# Patient Record
Sex: MALE | Race: WHITE | NOT HISPANIC OR LATINO
[De-identification: names, ages, dates, MRNs, and addresses within clinical notes are randomized per-mention and may not be internally consistent; named-entity substitution may affect disease eponyms.]

---

## 2017-11-29 PROBLEM — Z00.00 ENCOUNTER FOR PREVENTIVE HEALTH EXAMINATION: Status: ACTIVE | Noted: 2017-11-29

## 2018-01-05 ENCOUNTER — APPOINTMENT (OUTPATIENT)
Dept: CARDIOLOGY | Facility: CLINIC | Age: 83
End: 2018-01-05

## 2018-01-12 ENCOUNTER — APPOINTMENT (OUTPATIENT)
Dept: CARDIOLOGY | Facility: CLINIC | Age: 83
End: 2018-01-12

## 2018-01-15 ENCOUNTER — APPOINTMENT (OUTPATIENT)
Dept: CARDIOLOGY | Facility: CLINIC | Age: 83
End: 2018-01-15
Payer: MEDICARE

## 2018-01-15 PROCEDURE — 93279 PRGRMG DEV EVAL PM/LDLS PM: CPT

## 2018-01-19 ENCOUNTER — RECORD ABSTRACTING (OUTPATIENT)
Age: 83
End: 2018-01-19

## 2018-01-19 DIAGNOSIS — Z87.01 PERSONAL HISTORY OF PNEUMONIA (RECURRENT): ICD-10-CM

## 2018-01-19 DIAGNOSIS — Z87.19 PERSONAL HISTORY OF OTHER DISEASES OF THE DIGESTIVE SYSTEM: ICD-10-CM

## 2018-01-19 DIAGNOSIS — Z78.9 OTHER SPECIFIED HEALTH STATUS: ICD-10-CM

## 2018-02-14 ENCOUNTER — APPOINTMENT (OUTPATIENT)
Dept: CARDIOLOGY | Facility: CLINIC | Age: 83
End: 2018-02-14

## 2018-03-08 ENCOUNTER — APPOINTMENT (OUTPATIENT)
Dept: CARDIOLOGY | Facility: CLINIC | Age: 83
End: 2018-03-08
Payer: MEDICARE

## 2018-03-08 VITALS
WEIGHT: 185 LBS | HEART RATE: 79 BPM | OXYGEN SATURATION: 99 % | HEIGHT: 67 IN | SYSTOLIC BLOOD PRESSURE: 122 MMHG | DIASTOLIC BLOOD PRESSURE: 70 MMHG | BODY MASS INDEX: 29.03 KG/M2

## 2018-03-08 PROCEDURE — 99214 OFFICE O/P EST MOD 30 MIN: CPT

## 2018-03-08 PROCEDURE — 93000 ELECTROCARDIOGRAM COMPLETE: CPT

## 2018-03-08 RX ORDER — TRAZODONE HYDROCHLORIDE 50 MG/1
50 TABLET ORAL
Refills: 0 | Status: ACTIVE | COMMUNITY

## 2018-03-15 ENCOUNTER — MOBILE ON CALL (OUTPATIENT)
Age: 83
End: 2018-03-15

## 2018-03-22 ENCOUNTER — APPOINTMENT (OUTPATIENT)
Dept: CARDIOLOGY | Facility: CLINIC | Age: 83
End: 2018-03-22
Payer: MEDICARE

## 2018-03-22 VITALS
WEIGHT: 177 LBS | HEART RATE: 65 BPM | DIASTOLIC BLOOD PRESSURE: 60 MMHG | OXYGEN SATURATION: 95 % | SYSTOLIC BLOOD PRESSURE: 115 MMHG | HEIGHT: 67 IN | RESPIRATION RATE: 14 BRPM | BODY MASS INDEX: 27.78 KG/M2

## 2018-03-22 PROCEDURE — 93000 ELECTROCARDIOGRAM COMPLETE: CPT

## 2018-03-22 PROCEDURE — 99215 OFFICE O/P EST HI 40 MIN: CPT

## 2018-03-22 RX ORDER — APIXABAN 2.5 MG/1
2.5 TABLET, FILM COATED ORAL
Qty: 180 | Refills: 0 | Status: ACTIVE | COMMUNITY
Start: 2018-03-22 | End: 1900-01-01

## 2018-03-22 RX ORDER — ASPIRIN 81 MG
81 TABLET, DELAYED RELEASE (ENTERIC COATED) ORAL
Refills: 0 | Status: DISCONTINUED | COMMUNITY
End: 2018-03-22

## 2018-03-22 RX ORDER — HYDROCHLOROTHIAZIDE 25 MG/1
25 TABLET ORAL
Refills: 0 | Status: DISCONTINUED | COMMUNITY
End: 2018-03-22

## 2018-03-28 ENCOUNTER — RX RENEWAL (OUTPATIENT)
Age: 83
End: 2018-03-28

## 2018-03-28 ENCOUNTER — MEDICATION RENEWAL (OUTPATIENT)
Age: 83
End: 2018-03-28

## 2018-04-09 ENCOUNTER — RX RENEWAL (OUTPATIENT)
Age: 83
End: 2018-04-09

## 2018-04-10 ENCOUNTER — RECORD ABSTRACTING (OUTPATIENT)
Age: 83
End: 2018-04-10

## 2018-04-10 RX ORDER — SERTRALINE HYDROCHLORIDE 50 MG/1
50 TABLET, FILM COATED ORAL DAILY
Refills: 0 | Status: ACTIVE | COMMUNITY

## 2018-04-10 RX ORDER — SIMVASTATIN 40 MG/1
40 TABLET, FILM COATED ORAL
Qty: 90 | Refills: 3 | Status: ACTIVE | COMMUNITY

## 2018-04-12 ENCOUNTER — APPOINTMENT (OUTPATIENT)
Dept: CARDIOLOGY | Facility: CLINIC | Age: 83
End: 2018-04-12
Payer: MEDICARE

## 2018-04-12 VITALS
HEART RATE: 76 BPM | WEIGHT: 175 LBS | HEIGHT: 67 IN | RESPIRATION RATE: 12 BRPM | BODY MASS INDEX: 27.47 KG/M2 | DIASTOLIC BLOOD PRESSURE: 78 MMHG | SYSTOLIC BLOOD PRESSURE: 122 MMHG

## 2018-04-12 PROCEDURE — 93000 ELECTROCARDIOGRAM COMPLETE: CPT

## 2018-04-12 PROCEDURE — 99215 OFFICE O/P EST HI 40 MIN: CPT

## 2018-04-12 RX ORDER — ASPIRIN 81 MG
81 TABLET, DELAYED RELEASE (ENTERIC COATED) ORAL DAILY
Refills: 0 | Status: DISCONTINUED | COMMUNITY
End: 2018-04-12

## 2018-05-02 ENCOUNTER — APPOINTMENT (OUTPATIENT)
Dept: CARDIOLOGY | Facility: CLINIC | Age: 83
End: 2018-05-02
Payer: MEDICARE

## 2018-05-02 PROCEDURE — 78452 HT MUSCLE IMAGE SPECT MULT: CPT

## 2018-05-02 PROCEDURE — A9500: CPT

## 2018-05-02 PROCEDURE — 93015 CV STRESS TEST SUPVJ I&R: CPT

## 2018-05-02 RX ORDER — REGADENOSON 0.08 MG/ML
0.4 INJECTION, SOLUTION INTRAVENOUS
Qty: 1 | Refills: 0 | Status: COMPLETED | OUTPATIENT
Start: 2018-05-02

## 2018-05-02 RX ORDER — KIT FOR THE PREPARATION OF TECHNETIUM TC99M SESTAMIBI 1 MG/5ML
INJECTION, POWDER, LYOPHILIZED, FOR SOLUTION PARENTERAL
Refills: 0 | Status: COMPLETED | OUTPATIENT
Start: 2018-05-02

## 2018-05-02 RX ORDER — AMINOPHYLLINE 25 MG/ML
25 INJECTION, SOLUTION INTRAVENOUS
Qty: 0 | Refills: 0 | Status: COMPLETED | OUTPATIENT
Start: 2018-05-02

## 2018-05-02 RX ADMIN — KIT FOR THE PREPARATION OF TECHNETIUM TC99M SESTAMIBI 0: 1 INJECTION, POWDER, LYOPHILIZED, FOR SOLUTION PARENTERAL at 00:00

## 2018-05-02 RX ADMIN — REGADENOSON 0 MG/5ML: 0.08 INJECTION, SOLUTION INTRAVENOUS at 00:00

## 2018-05-02 RX ADMIN — AMINOPHYLLINE 0 MG/ML: 25 INJECTION, SOLUTION INTRAVENOUS at 00:00

## 2018-05-03 ENCOUNTER — APPOINTMENT (OUTPATIENT)
Dept: CARDIOLOGY | Facility: CLINIC | Age: 83
End: 2018-05-03
Payer: MEDICARE

## 2018-05-03 VITALS
RESPIRATION RATE: 12 BRPM | BODY MASS INDEX: 27.62 KG/M2 | WEIGHT: 176 LBS | DIASTOLIC BLOOD PRESSURE: 64 MMHG | HEIGHT: 67 IN | SYSTOLIC BLOOD PRESSURE: 116 MMHG

## 2018-05-03 PROCEDURE — 99214 OFFICE O/P EST MOD 30 MIN: CPT

## 2018-05-03 RX ORDER — LISINOPRIL AND HYDROCHLOROTHIAZIDE TABLETS 20; 12.5 MG/1; MG/1
20-12.5 TABLET ORAL
Qty: 90 | Refills: 0 | Status: DISCONTINUED | COMMUNITY
Start: 2018-02-28

## 2018-06-07 ENCOUNTER — APPOINTMENT (OUTPATIENT)
Dept: CARDIOLOGY | Facility: CLINIC | Age: 83
End: 2018-06-07

## 2018-06-12 ENCOUNTER — RECORD ABSTRACTING (OUTPATIENT)
Age: 83
End: 2018-06-12

## 2018-06-12 RX ORDER — LEVOFLOXACIN 500 MG/1
500 TABLET, FILM COATED ORAL
Qty: 4 | Refills: 0 | Status: COMPLETED | COMMUNITY
Start: 2018-03-15

## 2018-06-12 RX ORDER — ALBUTEROL SULFATE 90 UG/1
108 (90 BASE) AEROSOL, METERED RESPIRATORY (INHALATION)
Qty: 8 | Refills: 0 | Status: COMPLETED | COMMUNITY
Start: 2018-03-15

## 2018-06-12 RX ORDER — PNEUMOCOCCAL 23-VAL P-SAC VAC 25MCG/0.5
25 VIAL (ML) INJECTION
Qty: 1 | Refills: 0 | Status: COMPLETED | COMMUNITY
Start: 2017-12-14

## 2018-06-12 RX ORDER — METFORMIN ER 500 MG 500 MG/1
500 TABLET ORAL
Qty: 90 | Refills: 0 | Status: COMPLETED | COMMUNITY
Start: 2018-03-13

## 2018-06-15 ENCOUNTER — APPOINTMENT (OUTPATIENT)
Dept: CARDIOLOGY | Facility: CLINIC | Age: 83
End: 2018-06-15

## 2018-06-22 ENCOUNTER — APPOINTMENT (OUTPATIENT)
Dept: CARDIOLOGY | Facility: CLINIC | Age: 83
End: 2018-06-22
Payer: MEDICARE

## 2018-06-22 VITALS
BODY MASS INDEX: 27.15 KG/M2 | HEART RATE: 75 BPM | RESPIRATION RATE: 14 BRPM | DIASTOLIC BLOOD PRESSURE: 70 MMHG | WEIGHT: 173 LBS | HEIGHT: 67 IN | SYSTOLIC BLOOD PRESSURE: 122 MMHG

## 2018-06-22 PROCEDURE — 93000 ELECTROCARDIOGRAM COMPLETE: CPT

## 2018-06-22 PROCEDURE — 99214 OFFICE O/P EST MOD 30 MIN: CPT

## 2018-08-29 ENCOUNTER — MOBILE ON CALL (OUTPATIENT)
Age: 83
End: 2018-08-29

## 2018-09-06 ENCOUNTER — APPOINTMENT (OUTPATIENT)
Dept: CARDIOLOGY | Facility: CLINIC | Age: 83
End: 2018-09-06
Payer: MEDICARE

## 2018-09-06 VITALS
HEIGHT: 67 IN | RESPIRATION RATE: 14 BRPM | BODY MASS INDEX: 25.74 KG/M2 | HEART RATE: 74 BPM | WEIGHT: 164 LBS | DIASTOLIC BLOOD PRESSURE: 67 MMHG | SYSTOLIC BLOOD PRESSURE: 122 MMHG

## 2018-09-06 PROCEDURE — 99214 OFFICE O/P EST MOD 30 MIN: CPT

## 2018-09-06 PROCEDURE — 93000 ELECTROCARDIOGRAM COMPLETE: CPT

## 2018-09-18 ENCOUNTER — APPOINTMENT (OUTPATIENT)
Dept: CARDIOLOGY | Facility: CLINIC | Age: 83
End: 2018-09-18

## 2018-10-12 ENCOUNTER — RECORD ABSTRACTING (OUTPATIENT)
Age: 83
End: 2018-10-12

## 2018-10-12 RX ORDER — ALBUTEROL SULFATE 90 UG/1
108 AEROSOL, METERED RESPIRATORY (INHALATION)
Refills: 0 | Status: COMPLETED | COMMUNITY

## 2018-10-12 RX ORDER — SIMVASTATIN 40 MG/1
TABLET, FILM COATED ORAL
Refills: 0 | Status: COMPLETED | COMMUNITY

## 2018-10-16 ENCOUNTER — APPOINTMENT (OUTPATIENT)
Dept: CARDIOLOGY | Facility: CLINIC | Age: 83
End: 2018-10-16
Payer: MEDICARE

## 2018-10-16 VITALS
RESPIRATION RATE: 15 BRPM | SYSTOLIC BLOOD PRESSURE: 112 MMHG | BODY MASS INDEX: 26.06 KG/M2 | DIASTOLIC BLOOD PRESSURE: 80 MMHG | HEIGHT: 67 IN | WEIGHT: 166 LBS | OXYGEN SATURATION: 93 % | HEART RATE: 85 BPM

## 2018-10-16 PROCEDURE — 93000 ELECTROCARDIOGRAM COMPLETE: CPT

## 2018-10-16 PROCEDURE — 99215 OFFICE O/P EST HI 40 MIN: CPT

## 2018-10-16 RX ORDER — METOPROLOL TARTRATE 25 MG/1
25 TABLET, FILM COATED ORAL
Qty: 270 | Refills: 3 | Status: DISCONTINUED | COMMUNITY
Start: 2018-03-22 | End: 2018-10-16

## 2018-10-23 ENCOUNTER — TRANSCRIPTION ENCOUNTER (OUTPATIENT)
Age: 83
End: 2018-10-23

## 2018-10-23 ENCOUNTER — INPATIENT (INPATIENT)
Facility: HOSPITAL | Age: 83
LOS: 0 days | Discharge: HOME | DRG: 247 | End: 2018-10-24
Attending: INTERNAL MEDICINE | Admitting: INTERNAL MEDICINE
Payer: COMMERCIAL

## 2018-10-23 VITALS
RESPIRATION RATE: 16 BRPM | WEIGHT: 166.45 LBS | SYSTOLIC BLOOD PRESSURE: 131 MMHG | DIASTOLIC BLOOD PRESSURE: 70 MMHG | TEMPERATURE: 99 F | HEIGHT: 67 IN | OXYGEN SATURATION: 96 % | HEART RATE: 80 BPM

## 2018-10-23 DIAGNOSIS — I25.10 ATHEROSCLEROTIC HEART DISEASE OF NATIVE CORONARY ARTERY WITHOUT ANGINA PECTORIS: ICD-10-CM

## 2018-10-23 DIAGNOSIS — Z98.49 CATARACT EXTRACTION STATUS, UNSPECIFIED EYE: Chronic | ICD-10-CM

## 2018-10-23 DIAGNOSIS — Z95.0 PRESENCE OF CARDIAC PACEMAKER: Chronic | ICD-10-CM

## 2018-10-23 DIAGNOSIS — Z95.1 PRESENCE OF AORTOCORONARY BYPASS GRAFT: Chronic | ICD-10-CM

## 2018-10-23 LAB
ALBUMIN SERPL ELPH-MCNC: 4.3 G/DL — SIGNIFICANT CHANGE UP (ref 3.3–5.2)
ALP SERPL-CCNC: 94 U/L — SIGNIFICANT CHANGE UP (ref 40–120)
ALT FLD-CCNC: 10 U/L — SIGNIFICANT CHANGE UP
ANION GAP SERPL CALC-SCNC: 11 MMOL/L — SIGNIFICANT CHANGE UP (ref 5–17)
APTT BLD: 32.4 SEC — SIGNIFICANT CHANGE UP (ref 27.5–37.4)
AST SERPL-CCNC: 19 U/L — SIGNIFICANT CHANGE UP
BILIRUB SERPL-MCNC: 0.8 MG/DL — SIGNIFICANT CHANGE UP (ref 0.4–2)
BLD GP AB SCN SERPL QL: SIGNIFICANT CHANGE UP
BUN SERPL-MCNC: 37 MG/DL — HIGH (ref 8–20)
CALCIUM SERPL-MCNC: 9.6 MG/DL — SIGNIFICANT CHANGE UP (ref 8.6–10.2)
CHLORIDE SERPL-SCNC: 102 MMOL/L — SIGNIFICANT CHANGE UP (ref 98–107)
CO2 SERPL-SCNC: 27 MMOL/L — SIGNIFICANT CHANGE UP (ref 22–29)
CREAT SERPL-MCNC: 1.74 MG/DL — HIGH (ref 0.5–1.3)
GLUCOSE BLDC GLUCOMTR-MCNC: 86 MG/DL — SIGNIFICANT CHANGE UP (ref 70–99)
GLUCOSE SERPL-MCNC: 109 MG/DL — SIGNIFICANT CHANGE UP (ref 70–115)
HCT VFR BLD CALC: 41.3 % — LOW (ref 42–52)
HGB BLD-MCNC: 13.1 G/DL — LOW (ref 14–18)
INR BLD: 1.08 RATIO — SIGNIFICANT CHANGE UP (ref 0.88–1.16)
MCHC RBC-ENTMCNC: 28.7 PG — SIGNIFICANT CHANGE UP (ref 27–31)
MCHC RBC-ENTMCNC: 31.7 G/DL — LOW (ref 32–36)
MCV RBC AUTO: 90.6 FL — SIGNIFICANT CHANGE UP (ref 80–94)
PLATELET # BLD AUTO: 232 K/UL — SIGNIFICANT CHANGE UP (ref 150–400)
POTASSIUM SERPL-MCNC: 4.5 MMOL/L — SIGNIFICANT CHANGE UP (ref 3.5–5.3)
POTASSIUM SERPL-SCNC: 4.5 MMOL/L — SIGNIFICANT CHANGE UP (ref 3.5–5.3)
PROT SERPL-MCNC: 7.3 G/DL — SIGNIFICANT CHANGE UP (ref 6.6–8.7)
PROTHROM AB SERPL-ACNC: 11.9 SEC — SIGNIFICANT CHANGE UP (ref 9.8–12.7)
RBC # BLD: 4.56 M/UL — LOW (ref 4.6–6.2)
RBC # FLD: 14.4 % — SIGNIFICANT CHANGE UP (ref 11–15.6)
SODIUM SERPL-SCNC: 140 MMOL/L — SIGNIFICANT CHANGE UP (ref 135–145)
TYPE + AB SCN PNL BLD: SIGNIFICANT CHANGE UP
WBC # BLD: 10 K/UL — SIGNIFICANT CHANGE UP (ref 4.8–10.8)
WBC # FLD AUTO: 10 K/UL — SIGNIFICANT CHANGE UP (ref 4.8–10.8)

## 2018-10-23 PROCEDURE — 93010 ELECTROCARDIOGRAM REPORT: CPT

## 2018-10-23 RX ORDER — SIMVASTATIN 20 MG/1
1 TABLET, FILM COATED ORAL
Qty: 0 | Refills: 0 | COMMUNITY

## 2018-10-23 RX ORDER — ASPIRIN/CALCIUM CARB/MAGNESIUM 324 MG
81 TABLET ORAL ONCE
Qty: 0 | Refills: 0 | Status: COMPLETED | OUTPATIENT
Start: 2018-10-23 | End: 2018-10-23

## 2018-10-23 RX ORDER — DEXTROSE 50 % IN WATER 50 %
12.5 SYRINGE (ML) INTRAVENOUS ONCE
Qty: 0 | Refills: 0 | Status: DISCONTINUED | OUTPATIENT
Start: 2018-10-23 | End: 2018-10-24

## 2018-10-23 RX ORDER — METOPROLOL TARTRATE 50 MG
1 TABLET ORAL
Qty: 0 | Refills: 0 | COMMUNITY

## 2018-10-23 RX ORDER — GLUCAGON INJECTION, SOLUTION 0.5 MG/.1ML
1 INJECTION, SOLUTION SUBCUTANEOUS ONCE
Qty: 0 | Refills: 0 | Status: DISCONTINUED | OUTPATIENT
Start: 2018-10-23 | End: 2018-10-24

## 2018-10-23 RX ORDER — INSULIN LISPRO 100/ML
VIAL (ML) SUBCUTANEOUS
Qty: 0 | Refills: 0 | Status: DISCONTINUED | OUTPATIENT
Start: 2018-10-23 | End: 2018-10-24

## 2018-10-23 RX ORDER — LISINOPRIL 2.5 MG/1
10 TABLET ORAL DAILY
Qty: 0 | Refills: 0 | Status: DISCONTINUED | OUTPATIENT
Start: 2018-10-23 | End: 2018-10-24

## 2018-10-23 RX ORDER — TRAZODONE HCL 50 MG
0 TABLET ORAL
Qty: 0 | Refills: 0 | COMMUNITY

## 2018-10-23 RX ORDER — APIXABAN 2.5 MG/1
2.5 TABLET, FILM COATED ORAL
Qty: 0 | Refills: 0 | Status: DISCONTINUED | OUTPATIENT
Start: 2018-10-23 | End: 2018-10-24

## 2018-10-23 RX ORDER — METOPROLOL TARTRATE 50 MG
50 TABLET ORAL
Qty: 0 | Refills: 0 | Status: DISCONTINUED | OUTPATIENT
Start: 2018-10-23 | End: 2018-10-24

## 2018-10-23 RX ORDER — FUROSEMIDE 40 MG
1 TABLET ORAL
Qty: 0 | Refills: 0 | COMMUNITY

## 2018-10-23 RX ORDER — APIXABAN 2.5 MG/1
1 TABLET, FILM COATED ORAL
Qty: 0 | Refills: 0 | COMMUNITY

## 2018-10-23 RX ORDER — LISINOPRIL 2.5 MG/1
1 TABLET ORAL
Qty: 0 | Refills: 0 | COMMUNITY

## 2018-10-23 RX ORDER — DEXTROSE 50 % IN WATER 50 %
25 SYRINGE (ML) INTRAVENOUS ONCE
Qty: 0 | Refills: 0 | Status: DISCONTINUED | OUTPATIENT
Start: 2018-10-23 | End: 2018-10-24

## 2018-10-23 RX ORDER — ACETAMINOPHEN 500 MG
650 TABLET ORAL EVERY 6 HOURS
Qty: 0 | Refills: 0 | Status: DISCONTINUED | OUTPATIENT
Start: 2018-10-23 | End: 2018-10-24

## 2018-10-23 RX ORDER — SODIUM CHLORIDE 9 MG/ML
1000 INJECTION, SOLUTION INTRAVENOUS
Qty: 0 | Refills: 0 | Status: DISCONTINUED | OUTPATIENT
Start: 2018-10-23 | End: 2018-10-24

## 2018-10-23 RX ORDER — SERTRALINE 25 MG/1
50 TABLET, FILM COATED ORAL DAILY
Qty: 0 | Refills: 0 | Status: DISCONTINUED | OUTPATIENT
Start: 2018-10-23 | End: 2018-10-24

## 2018-10-23 RX ORDER — DEXTROSE 50 % IN WATER 50 %
15 SYRINGE (ML) INTRAVENOUS ONCE
Qty: 0 | Refills: 0 | Status: DISCONTINUED | OUTPATIENT
Start: 2018-10-23 | End: 2018-10-24

## 2018-10-23 RX ORDER — SIMVASTATIN 20 MG/1
40 TABLET, FILM COATED ORAL AT BEDTIME
Qty: 0 | Refills: 0 | Status: DISCONTINUED | OUTPATIENT
Start: 2018-10-23 | End: 2018-10-24

## 2018-10-23 RX ORDER — SERTRALINE 25 MG/1
1 TABLET, FILM COATED ORAL
Qty: 0 | Refills: 0 | COMMUNITY

## 2018-10-23 RX ORDER — SODIUM CHLORIDE 9 MG/ML
300 INJECTION INTRAMUSCULAR; INTRAVENOUS; SUBCUTANEOUS
Qty: 0 | Refills: 0 | Status: DISCONTINUED | OUTPATIENT
Start: 2018-10-23 | End: 2018-10-24

## 2018-10-23 RX ORDER — CLOPIDOGREL BISULFATE 75 MG/1
75 TABLET, FILM COATED ORAL DAILY
Qty: 0 | Refills: 0 | Status: DISCONTINUED | OUTPATIENT
Start: 2018-10-24 | End: 2018-10-24

## 2018-10-23 RX ORDER — APIXABAN 2.5 MG/1
2.5 TABLET, FILM COATED ORAL
Qty: 0 | Refills: 0 | Status: DISCONTINUED | OUTPATIENT
Start: 2018-10-23 | End: 2018-10-23

## 2018-10-23 RX ORDER — ONDANSETRON 8 MG/1
4 TABLET, FILM COATED ORAL EVERY 8 HOURS
Qty: 0 | Refills: 0 | Status: DISCONTINUED | OUTPATIENT
Start: 2018-10-23 | End: 2018-10-24

## 2018-10-23 RX ORDER — PANTOPRAZOLE SODIUM 20 MG/1
40 TABLET, DELAYED RELEASE ORAL ONCE
Qty: 0 | Refills: 0 | Status: COMPLETED | OUTPATIENT
Start: 2018-10-23 | End: 2018-10-23

## 2018-10-23 RX ORDER — TRAZODONE HCL 50 MG
50 TABLET ORAL DAILY
Qty: 0 | Refills: 0 | Status: DISCONTINUED | OUTPATIENT
Start: 2018-10-23 | End: 2018-10-24

## 2018-10-23 RX ADMIN — Medication 81 MILLIGRAM(S): at 15:00

## 2018-10-23 RX ADMIN — Medication 50 MILLIGRAM(S): at 19:31

## 2018-10-23 RX ADMIN — APIXABAN 2.5 MILLIGRAM(S): 2.5 TABLET, FILM COATED ORAL at 22:42

## 2018-10-23 RX ADMIN — SIMVASTATIN 40 MILLIGRAM(S): 20 TABLET, FILM COATED ORAL at 22:42

## 2018-10-23 RX ADMIN — PANTOPRAZOLE SODIUM 40 MILLIGRAM(S): 20 TABLET, DELAYED RELEASE ORAL at 17:07

## 2018-10-23 NOTE — DISCHARGE NOTE ADULT - PRINCIPAL DIAGNOSIS
CAD (coronary artery disease) Coronary artery disease of bypass graft of native heart with stable angina pectoris

## 2018-10-23 NOTE — DISCHARGE NOTE ADULT - HOSPITAL COURSE
LHC VIA LFA w/angioseal with SHALONDA to SVG to RCA This is an 82 y/o male with h/o ICM and LV dysfunction, chronic afib on A/C, CRI, CAD s/p CABG x 4 with c/o NAZARIO.  NST revealed moderate reversible ischemia in the anterolateral wall of the left ventricle and reduced EF of 30%. He had a left heart catheterization which showed:  VENTRICLES: No LV gram was performed; however, a recent echocardiogram demonstrated an EF of 30 %.  CORONARY VESSELS: The coronary circulation is right dominant.  LM:     --  Ostial LM: There was a 80 % stenosis.  --  Mid left main: There was a 80 % stenosis.  LAD:     --  Mid LAD: There was a 100 % stenosis.  --  D1: There was a 95 % stenosis.  CX:     --  Proximal circumflex: There was a 95 % stenosis.  RCA:     --  Proximal RCA: There was a 100 % stenosis.  GRAFTS:     --  Graft to the LAD: The graft was a LIMA. Graft angiography showed no evidence of disease.  --  Graft to the distal RCA: The graft was a saphenous vein graft from the aorta. There was a 80 % stenosis in the proximal third of the graft which was treated with an DIDIER 3.00 X 26MM drug-eluting stent and a SYNERGY 2.75MM X 20MM drug-eluting stent.    Plan:   1. Discharge home today  2. Follow up as an outpatient with: Dr. Hammonds  3. Post procedure teaching done including importance of medication adherence and access site care and monitoring.  4. DAPT: Plavix 75mg daily and Eliquis 2.5mg BID, no aspirin secondary to bleeding risk  5. Statin: Simvastatin 40mg daily  6. Beta Blocker: Metoprolol 50mg BID  7. ACEI/ARB: Lisinopril 2.5mg daily  8. Diuretic: Lasix 40mg daily  9. Will give prescription for BMP to be drawn Friday October 26, 2018, results to be sent to Dr. Marin at [facility].  10. Smoking cessation and nicotine replacement offered to patient.

## 2018-10-23 NOTE — DISCHARGE NOTE ADULT - CARE PROVIDER_API CALL
Michi Hammonds), Internal Medicine  [address] [place] [address]  Phone: [phone]  Fax: [phone] Michi Hammonds), Internal Medicine  [facility] [place] [address]  Phone: [phone]  Fax: [phone]    Maritza Marin), Family Medicine  [address] [place] [address]  Phone: [phone]  Fax: [phone]

## 2018-10-23 NOTE — PROGRESS NOTE ADULT - PROBLEM SELECTOR PLAN 1
Daily plavix 75/ Eliquis 2.5 q12 if left groin stable NO Aspirin per Dr. Martin  AM Labs/ECG  IVF for hydration   Hold metformin for renal protection  Trend creatinine prior to reinitiating lasix in morning  FU Dr. Hammonds

## 2018-10-23 NOTE — DISCHARGE NOTE ADULT - MEDICATION SUMMARY - MEDICATIONS TO CHANGE
I will SWITCH the dose or number of times a day I take the medications listed below when I get home from the hospital:    metFORMIN 500 mg oral tablet  -- orally once a day

## 2018-10-23 NOTE — H&P PST ADULT - NSANTHOSAYNRD_GEN_A_CORE
No. ARVIN screening performed.  STOP BANG Legend: 0-2 = LOW Risk; 3-4 = INTERMEDIATE Risk; 5-8 = HIGH Risk

## 2018-10-23 NOTE — DISCHARGE NOTE ADULT - NS AS ACTIVITY OBS
Showering allowed/Do not drive or operate machinery/No Heavy lifting/straining/Do not make important decisions Showering allowed/Walking-Indoors allowed/Walking-Outdoors allowed/No Heavy lifting/straining/Do not drive or operate machinery/Stairs allowed/Do not make important decisions

## 2018-10-23 NOTE — DISCHARGE NOTE ADULT - CARE PROVIDERS DIRECT ADDRESSES
[email or web address]

## 2018-10-23 NOTE — PROGRESS NOTE ADULT - ASSESSMENT
A-s/p s/p LHC LFA w/angioseal closure  SHALONDA x2 SVG to RCA DIDIER 3.0 x 26mm, Synergy  2.75 x 20 mm    AFib

## 2018-10-23 NOTE — H&P PST ADULT - PMH
Atrial fibrillation    CAD (coronary artery disease)    Cardiomyopathy, ischemic    Diabetes mellitus    GIB (gastrointestinal bleeding)    Hyperlipidemia    Hypertension    Pacemaker    Pneumonia

## 2018-10-23 NOTE — PROGRESS NOTE ADULT - SUBJECTIVE AND OBJECTIVE BOX
Cardiology NP note addendum:     -Admit to tele overnight s/p coronary stent placement secondary to meeting major admitting criteria of elevated serum creatine >1.5  -Plavix and Eliquis post PCI; no ASA secondary to elevated bleeding risk in the elderly as per Dr. Martin

## 2018-10-23 NOTE — DISCHARGE NOTE ADULT - PATIENT PORTAL LINK FT
You can access the [facility] Patient Portal, offered by [facility], by registering with the following website: [email or web address] Psychiatric Center

## 2018-10-23 NOTE — DISCHARGE NOTE ADULT - MEDICATION SUMMARY - MEDICATIONS TO TAKE
I will START or STAY ON the medications listed below when I get home from the hospital:    SMA-7  -- Please send results to Dr. Maritza Marin, [facility], [address] [place] [address], Phone: [phone], Fax: [phone]  -- Indication: For Renal Insufficiency    lisinopril 10 mg oral tablet  -- 1 tab(s) by mouth once a day  -- Indication: For Hypertension    Eliquis 2.5 mg oral tablet  -- 1 tab(s) by mouth 2 times a day  -- Indication: For Atrial fibrillation    traZODone 50 mg oral tablet  -- orally once a day  -- Indication: For Anxiety    sertraline 50 mg oral tablet  -- 1 tab(s) by mouth once a day  -- Indication: For Anxiety    metFORMIN 500 mg oral tablet  -- orally once a day. RESTART ON OCTOBER 26, 2018  -- Indication: For Diabetes mellitus    Zocor 40 mg oral tablet  -- 1 tab(s) by mouth once a day (at bedtime)  -- Indication: For Hyperlipidemia    clopidogrel 75 mg oral tablet  -- 1 tab(s) by mouth once a day  -- Indication: For CAD (coronary artery disease)    metoprolol tartrate 50 mg oral tablet  -- 1 tab(s) by mouth 2 times a day  -- Indication: For Hypertension    furosemide 40 mg oral tablet  -- 1 tab(s) by mouth once a day  -- Indication: For Cardiomyopathy, ischemic

## 2018-10-23 NOTE — H&P PST ADULT - HISTORY OF PRESENT ILLNESS
This is an 82 y/o male with h/o ICM and LV dysfunction, chronic afib on A/C, CRI, CAD s/p CABG x 4 with c/o NAZARIO.  NST revealed moderate reversible ischemia in the anterolateral wall of the left ventricle and reduced EF of 30%.  He presents, today, for LHC +/- PCI.

## 2018-10-23 NOTE — DISCHARGE NOTE ADULT - PLAN OF CARE
OPTIMAL CARDIAC FUNCTION DORA Hammonds Follow up in 2 weeks with Dr. Hammonds  Continue with all prescribed medications.  Don't stop your antiplatelet medication (Plavix/Effient/Brilinta) without asking your cardiologist first.  Follow your prescribed diet.

## 2018-10-23 NOTE — DISCHARGE NOTE ADULT - INSTRUCTIONS
Activities as tolerated minimize stair climbing, heavy lifting greater than 10lbs, strenous house work, contact sports,No intercourse for 1 week. Site care no Bath tubs, Hot tubs , Pools for 1 week. Monitor site for infection such as warmth drainage or swelling of site. Monitor for bleeding call MD if continous bleeding occurs hold pressure report to nearest ER, Do not opperate heavy machinery or drive for 24hours.  REMOVE LEFT GROIN DRESSING WITHIN 24 HOURS OF DISCHARGE

## 2018-10-23 NOTE — H&P PST ADULT - ASSESSMENT
This is an 82 y/o male with h/o CAD s/p CABG, chronic afib with NAZARIO and abnormal NST for LHC +/- PCI.

## 2018-10-23 NOTE — DISCHARGE NOTE ADULT - CARE PLAN
Principal Discharge DX:	CAD (coronary artery disease)  Goal:	OPTIMAL CARDIAC FUNCTION  Assessment and plan of treatment:	DORA Hammonds Principal Discharge DX:	Coronary artery disease of bypass graft of native heart with stable angina pectoris  Goal:	OPTIMAL CARDIAC FUNCTION  Assessment and plan of treatment:	Follow up in 2 weeks with Dr. Hammonds  Continue with all prescribed medications.  Don't stop your antiplatelet medication (Plavix/Effient/Brilinta) without asking your cardiologist first.  Follow your prescribed diet.

## 2018-10-23 NOTE — PROGRESS NOTE ADULT - SUBJECTIVE AND OBJECTIVE BOX
s/p LHC LFA w/angioseal closure  SHALONDA x2 SVG to RCA DIDIER 3.0 x 26mm, Synergy  2.75 x 20 mm  Tolerated procedure well w/o complications  Seen post procedure by Dr. Martin          REVIEW OF SYSTEMS:  Denies SOB, CP, NV, HA, dizziness, palpitations, site pain    PHYSICAL EXAM: A&Ox3 NAD Skin warm and dry  NEURO: Speech intact +gag +swallow Tongue midline SNIDER  NECK: No JVD, trachea midline. Eupneic  HEART: irreg AFib on tele /ECG  PULMONARY:  CTA kendal  ABDOMEN: Soft nontender X4 +BS Vdg/eating  EXTREMITIES: LFA site: No bleed, hematoma, pain, ecchymosis or swelling Rt DP/PT+

## 2018-10-24 VITALS — HEART RATE: 60 BPM | RESPIRATION RATE: 18 BRPM | OXYGEN SATURATION: 97 %

## 2018-10-24 LAB
ANION GAP SERPL CALC-SCNC: 14 MMOL/L — SIGNIFICANT CHANGE UP (ref 5–17)
BUN SERPL-MCNC: 35 MG/DL — HIGH (ref 8–20)
CALCIUM SERPL-MCNC: 9.5 MG/DL — SIGNIFICANT CHANGE UP (ref 8.6–10.2)
CHLORIDE SERPL-SCNC: 101 MMOL/L — SIGNIFICANT CHANGE UP (ref 98–107)
CO2 SERPL-SCNC: 23 MMOL/L — SIGNIFICANT CHANGE UP (ref 22–29)
CREAT SERPL-MCNC: 1.54 MG/DL — HIGH (ref 0.5–1.3)
GLUCOSE SERPL-MCNC: 88 MG/DL — SIGNIFICANT CHANGE UP (ref 70–115)
HBA1C BLD-MCNC: 5.9 % — HIGH (ref 4–5.6)
HCT VFR BLD CALC: 39.8 % — LOW (ref 42–52)
HGB BLD-MCNC: 12.7 G/DL — LOW (ref 14–18)
INR BLD: 1.22 RATIO — HIGH (ref 0.88–1.16)
MAGNESIUM SERPL-MCNC: 2.2 MG/DL — SIGNIFICANT CHANGE UP (ref 1.8–2.6)
MCHC RBC-ENTMCNC: 28.7 PG — SIGNIFICANT CHANGE UP (ref 27–31)
MCHC RBC-ENTMCNC: 31.9 G/DL — LOW (ref 32–36)
MCV RBC AUTO: 90 FL — SIGNIFICANT CHANGE UP (ref 80–94)
PLATELET # BLD AUTO: 216 K/UL — SIGNIFICANT CHANGE UP (ref 150–400)
POTASSIUM SERPL-MCNC: 4.2 MMOL/L — SIGNIFICANT CHANGE UP (ref 3.5–5.3)
POTASSIUM SERPL-SCNC: 4.2 MMOL/L — SIGNIFICANT CHANGE UP (ref 3.5–5.3)
PROTHROM AB SERPL-ACNC: 13.5 SEC — HIGH (ref 9.8–12.7)
RBC # BLD: 4.42 M/UL — LOW (ref 4.6–6.2)
RBC # FLD: 14.4 % — SIGNIFICANT CHANGE UP (ref 11–15.6)
SODIUM SERPL-SCNC: 138 MMOL/L — SIGNIFICANT CHANGE UP (ref 135–145)
WBC # BLD: 12.4 K/UL — HIGH (ref 4.8–10.8)
WBC # FLD AUTO: 12.4 K/UL — HIGH (ref 4.8–10.8)

## 2018-10-24 PROCEDURE — 93010 ELECTROCARDIOGRAM REPORT: CPT

## 2018-10-24 RX ORDER — METFORMIN HYDROCHLORIDE 850 MG/1
0 TABLET ORAL
Qty: 0 | Refills: 0 | COMMUNITY

## 2018-10-24 RX ORDER — CLOPIDOGREL BISULFATE 75 MG/1
1 TABLET, FILM COATED ORAL
Qty: 90 | Refills: 3 | OUTPATIENT
Start: 2018-10-24 | End: 2019-10-18

## 2018-10-24 RX ADMIN — Medication 50 MILLIGRAM(S): at 06:21

## 2018-10-24 RX ADMIN — LISINOPRIL 10 MILLIGRAM(S): 2.5 TABLET ORAL at 06:21

## 2018-10-24 NOTE — PROGRESS NOTE ADULT - SUBJECTIVE AND OBJECTIVE BOX
Department of Cardiology                                                                  [facility], [place] [address] [address] [facility]                                                            Telephone: [phone]. Fax:[phone]                                                                                         CARIOLOGY DISCHARGE NOTE       Subjective:  83y Male who had a left heart catheterization which showed:  VENTRICLES: No LV gram was performed; however, a recent echocardiogram demonstrated an EF of 30 %.  CORONARY VESSELS: The coronary circulation is right dominant.  LM:     --  Ostial LM: There was a 80 % stenosis.  --  Mid left main: There was a 80 % stenosis.  LAD:     --  Mid LAD: There was a 100 % stenosis.  --  D1: There was a 95 % stenosis.  CX:     --  Proximal circumflex: There was a 95 % stenosis.  RCA:     --  Proximal RCA: There was a 100 % stenosis.  GRAFTS:     --  Graft to the LAD: The graft was a LIMA. Graft angiography showed no evidence of disease.  --  Graft to the distal RCA: The graft was a saphenous vein graft from the aorta. There was a 80 % stenosis in the proximal third of the graft which was treated with an DIDIER 3.00 X 26MM drug-eluting stent and a SYNERGY 2.75MM X 20MM drug-eluting stent.    Interval history: No chest pain, SOB, or palpitations overnight.    HPI: This is an 82 y/o male with h/o ICM and LV dysfunction, chronic afib on A/C, CRI, CAD s/p CABG x 4 with c/o NAZARIO.  NST revealed moderate reversible ischemia in the anterolateral wall of the left ventricle and reduced EF of 30%.  He presents, today, for LHC +/- PCI. (23 Oct 2018 13:00)    PAST MEDICAL & SURGICAL HISTORY:  Pacemaker  CAD (coronary artery disease)  S/P CABG x 4  Atrial fibrillation  HFrEF  Pneumonia  Cardiomyopathy, ischemic  GIB (gastrointestinal bleeding)  Hypertension  Hyperlipidemia  Diabetes mellitus  CAD (coronary artery disease)  Atrial fibrillation  S/P cataract surgery: bilateral    Allergies: No Known Allergies    Home Medications:  Eliquis 2.5 mg oral tablet: 1 tab(s) orally 2 times a day (23 Oct 2018 19:22)  furosemide 40 mg oral tablet: 1 tab(s) orally once a day (23 Oct 2018 19:22)  lisinopril 10 mg oral tablet: 1 tab(s) orally once a day (23 Oct 2018 19:22)  metFORMIN 500 mg oral tablet: orally once a day (23 Oct 2018 19:22)  metoprolol tartrate 50 mg oral tablet: 1 tab(s) orally 2 times a day (23 Oct 2018 19:22)  sertraline 50 mg oral tablet: 1 tab(s) orally once a day (23 Oct 2018 19:22)  traZODone 50 mg oral tablet: orally once a day (23 Oct 2018 19:22)  Zocor 40 mg oral tablet: 1 tab(s) orally once a day (at bedtime) (23 Oct 2018 19:22)    MEDICATIONS  (STANDING):  apixaban 2.5 milliGRAM(s) Oral <User Schedule>  clopidogrel Tablet 75 milliGRAM(s) Oral daily  dextrose 5%. 1000 milliLiter(s) (50 mL/Hr) IV Continuous <Continuous>  dextrose 50% Injectable 12.5 Gram(s) IV Push once  dextrose 50% Injectable 25 Gram(s) IV Push once  dextrose 50% Injectable 25 Gram(s) IV Push once  insulin lispro (HumaLOG) corrective regimen sliding scale   SubCutaneous three times a day before meals  lisinopril 10 milliGRAM(s) Oral daily  metoprolol tartrate 50 milliGRAM(s) Oral two times a day  sertraline 50 milliGRAM(s) Oral daily  simvastatin 40 milliGRAM(s) Oral at bedtime  sodium chloride 0.9%. 300 milliLiter(s) (50 mL/Hr) IV Continuous <Continuous>  traZODone 50 milliGRAM(s) Oral daily    MEDICATIONS  (PRN):  acetaminophen   Tablet .. 650 milliGRAM(s) Oral every 6 hours PRN Mild Pain (1 - 3)  aluminum hydroxide/magnesium hydroxide/simethicone Suspension 30 milliLiter(s) Oral every 4 hours PRN Dyspepsia  dextrose 40% Gel 15 Gram(s) Oral once PRN Blood Glucose LESS THAN 70 milliGRAM(s)/deciliter  glucagon  Injectable 1 milliGRAM(s) IntraMuscular once PRN Glucose LESS THAN 70 milligrams/deciliter  ondansetron Injectable 4 milliGRAM(s) IV Push every 8 hours PRN Nausea and/or Vomiting      Objective:  Vital Signs Last 24 Hrs  T(C): 37.1 (23 Oct 2018 13:15), Max: 37.1 (23 Oct 2018 13:15)  T(F): 98.7 (23 Oct 2018 13:15), Max: 98.7 (23 Oct 2018 13:15)  HR: 62 (24 Oct 2018 06:15) (60 - 82)  BP: 141/96 (24 Oct 2018 06:15) (120/58 - 178/81)  RR: 18 (24 Oct 2018 06:15) (14 - 20)  SpO2: 98% (24 Oct 2018 06:15) (96% - 100%)    CM: SR  Neuro: A&OX3, CN 2-12 intact  HEENT: NC, AT  Lungs: CTA B/L  CV: S1, S2, no murmur, RRR  Abd: Soft  Right Groin: Soft, no bleeding, no hematoma  Extremity: + distal pulses  EKG: Ventricular paced with underlying AF                          12.7   12.4  )-----------( 216      ( 24 Oct 2018 06:12 )             39.8     10-24    138  |  101  |  35.0  ----------------------<  88  4.2   |  23.0  |  1.54    Ca    9.5      24 Oct 2018 06:12  Mg     2.2     10-24    TPro  7.3  /  Alb  4.3  /  TBili  0.8  /  DBili  x   /  AST  19  /  ALT  10  /  AlkPhos  94  10-23    PT/INR - ( 24 Oct 2018 06:12 )   PT: 13.5 sec;   INR: 1.22 ratio      PTT - ( 23 Oct 2018 13:53 )  PTT:32.4 sec Department of Cardiology                                                                  [facility]/[place] [address] [address] [facility]                                                            Telephone: [phone]. Fax:[phone]                                                                                         CARIOLOGY DISCHARGE NOTE       Subjective:  83y Male who had a left heart catheterization which showed:  VENTRICLES: No LV gram was performed; however, a recent echocardiogram demonstrated an EF of 30 %.  CORONARY VESSELS: The coronary circulation is right dominant.  LM:     --  Ostial LM: There was a 80 % stenosis.  --  Mid left main: There was a 80 % stenosis.  LAD:     --  Mid LAD: There was a 100 % stenosis.  --  D1: There was a 95 % stenosis.  CX:     --  Proximal circumflex: There was a 95 % stenosis.  RCA:     --  Proximal RCA: There was a 100 % stenosis.  GRAFTS:     --  Graft to the LAD: The graft was a LIMA. Graft angiography showed no evidence of disease.  --  Graft to the distal RCA: The graft was a saphenous vein graft from the aorta. There was a 80 % stenosis in the proximal third of the graft which was treated with an DIDIER 3.00 X 26MM drug-eluting stent and a SYNERGY 2.75MM X 20MM drug-eluting stent.    Interval history: No chest pain, SOB, or palpitations overnight.    HPI: This is an 84 y/o male with h/o ICM and LV dysfunction, chronic afib on A/C, CRI, CAD s/p CABG x 4 with c/o NAZARIO.  NST revealed moderate reversible ischemia in the anterolateral wall of the left ventricle and reduced EF of 30%.  He presents, today, for LHC +/- PCI. (23 Oct 2018 13:00)    PAST MEDICAL & SURGICAL HISTORY:  Pacemaker  CAD (coronary artery disease)  S/P CABG x 4  Atrial fibrillation  HFrEF  Pneumonia  Cardiomyopathy, ischemic  GIB (gastrointestinal bleeding)  Hypertension  Hyperlipidemia  Diabetes mellitus  CAD (coronary artery disease)  Atrial fibrillation  S/P cataract surgery: bilateral    Allergies: No Known Allergies    Home Medications:  Eliquis 2.5 mg oral tablet: 1 tab(s) orally 2 times a day (23 Oct 2018 19:22)  furosemide 40 mg oral tablet: 1 tab(s) orally once a day (23 Oct 2018 19:22)  lisinopril 10 mg oral tablet: 1 tab(s) orally once a day (23 Oct 2018 19:22)  metFORMIN 500 mg oral tablet: orally once a day (23 Oct 2018 19:22)  metoprolol tartrate 50 mg oral tablet: 1 tab(s) orally 2 times a day (23 Oct 2018 19:22)  sertraline 50 mg oral tablet: 1 tab(s) orally once a day (23 Oct 2018 19:22)  traZODone 50 mg oral tablet: orally once a day (23 Oct 2018 19:22)  Zocor 40 mg oral tablet: 1 tab(s) orally once a day (at bedtime) (23 Oct 2018 19:22)    MEDICATIONS  (STANDING):  apixaban 2.5 milliGRAM(s) Oral <User Schedule>  clopidogrel Tablet 75 milliGRAM(s) Oral daily  dextrose 5%. 1000 milliLiter(s) (50 mL/Hr) IV Continuous <Continuous>  dextrose 50% Injectable 12.5 Gram(s) IV Push once  dextrose 50% Injectable 25 Gram(s) IV Push once  dextrose 50% Injectable 25 Gram(s) IV Push once  insulin lispro (HumaLOG) corrective regimen sliding scale   SubCutaneous three times a day before meals  lisinopril 10 milliGRAM(s) Oral daily  metoprolol tartrate 50 milliGRAM(s) Oral two times a day  sertraline 50 milliGRAM(s) Oral daily  simvastatin 40 milliGRAM(s) Oral at bedtime  sodium chloride 0.9%. 300 milliLiter(s) (50 mL/Hr) IV Continuous <Continuous>  traZODone 50 milliGRAM(s) Oral daily    MEDICATIONS  (PRN):  acetaminophen   Tablet .. 650 milliGRAM(s) Oral every 6 hours PRN Mild Pain (1 - 3)  aluminum hydroxide/magnesium hydroxide/simethicone Suspension 30 milliLiter(s) Oral every 4 hours PRN Dyspepsia  dextrose 40% Gel 15 Gram(s) Oral once PRN Blood Glucose LESS THAN 70 milliGRAM(s)/deciliter  glucagon  Injectable 1 milliGRAM(s) IntraMuscular once PRN Glucose LESS THAN 70 milligrams/deciliter  ondansetron Injectable 4 milliGRAM(s) IV Push every 8 hours PRN Nausea and/or Vomiting    Objective:  Vital Signs Last 24 Hrs  T(C): 37.1 (23 Oct 2018 13:15), Max: 37.1 (23 Oct 2018 13:15)  T(F): 98.7 (23 Oct 2018 13:15), Max: 98.7 (23 Oct 2018 13:15)  HR: 62 (24 Oct 2018 06:15) (60 - 82)  BP: 141/96 (24 Oct 2018 06:15) (120/58 - 178/81)  RR: 18 (24 Oct 2018 06:15) (14 - 20)  SpO2: 98% (24 Oct 2018 06:15) (96% - 100%)    CM: AF and ventricular paced  Neuro: A&OX3, CN 2-12 intact  HEENT: NC, AT  Lungs: CTA B/L  CV: S1, S2, no murmur, RRR  Abd: Soft  Right Groin: Soft, no bleeding, no hematoma, small ecchymotic area  Extremity: + distal pulses  EKG: Ventricular paced with underlying AF                          12.7   12.4  )-----------( 216      ( 24 Oct 2018 06:12 )             39.8     10-24    138  |  101  |  35.0  ----------------------<  88  4.2   |  23.0  |  1.54    Ca    9.5      24 Oct 2018 06:12  Mg     2.2     10-24    TPro  7.3  /  Alb  4.3  /  TBili  0.8  /  DBili  x   /  AST  19  /  ALT  10  /  AlkPhos  94  10-23    PT/INR - ( 24 Oct 2018 06:12 )   PT: 13.5 sec;   INR: 1.22 ratio      PTT - ( 23 Oct 2018 13:53 )  PTT:32.4 sec

## 2018-10-24 NOTE — PROGRESS NOTE ADULT - ASSESSMENT
Procedure: Left heart catheterization and PCI with SHALONDA of the SVG to the dRCA  Discharge Diagnosis: CAD of bypass graft of native heart with stable angina  Indication for PCI: CCS class 3 anginal equivalent (NAZARIO) with abnormal nuclear stress test (high risk study)  Admitted as inpatient secondary to: Age > 75, creatinine of 1.74 (GFR 35)  Problem List:   1. CAD of bypass graft of native heart with stable angina, S/P PCI of SVG to the dRCA  2. Acute on chronic renal insufficiency    Plan:   1. Discharge home today    2. Follow up as an outpatient with: Dr. Hammonds    3. Post procedure teaching done including importance of medication adherence and access site care and monitoring.    4. DAPT: Plavix 75mg daily and Eliquis 2.5mg BID, no aspirin secondary to bleeding risk    5. Statin: Simvastatin 40mg daily    6. Beta Blocker: Metoprolol 50mg BID    7. ACEI/ARB: Lisinopril 2.5mg daily Procedure: Left heart catheterization and PCI with SHALONDA of the SVG to the [facility]  Discharge Diagnosis: CAD of bypass graft of native heart with stable angina  Indication for PCI: CCS class 3 anginal equivalent (NAZARIO) with abnormal nuclear stress test (high risk study) on one antianginal  Admitted as inpatient secondary to: Age > 75, creatinine of 1.74 (GFR 35)  Problem List:   1. CAD of bypass graft of native heart with stable angina, S/P PCI of SVG to the CA  2. Acute on chronic renal insufficiency, at increased risk of JOVANA  3. HFrEF  4. Tobacco dependence    Plan:   1. Discharge home today    2. Follow up as an outpatient with: Dr. Hammonds    3. Post procedure teaching done including importance of medication adherence and access site care and monitoring.    4. DAPT: Plavix 75mg daily and Eliquis 2.5mg BID, no aspirin secondary to bleeding risk    5. Statin: Simvastatin 40mg daily    6. Beta Blocker: Metoprolol 50mg BID    7. ACEI/ARB: Lisinopril 2.5mg daily    8. Diuretic: Lasix 40mg daily    9. Will give prescription for BMP to be drawn Friday October 26, 2018, results to be sent to Dr. Marin at [facility].    10. Smoking cessation and nicotine replacement offered to patient.

## 2018-10-25 ENCOUNTER — INBOUND DOCUMENT (OUTPATIENT)
Age: 83
End: 2018-10-25

## 2018-10-26 RX ORDER — METFORMIN HYDROCHLORIDE 850 MG/1
0 TABLET ORAL
Qty: 0 | Refills: 0 | COMMUNITY
Start: 2018-10-26

## 2018-10-30 PROBLEM — K92.2 GASTROINTESTINAL HEMORRHAGE, UNSPECIFIED: Chronic | Status: ACTIVE | Noted: 2018-10-23

## 2018-10-30 PROBLEM — I48.91 UNSPECIFIED ATRIAL FIBRILLATION: Chronic | Status: ACTIVE | Noted: 2018-10-23

## 2018-10-30 PROBLEM — E78.5 HYPERLIPIDEMIA, UNSPECIFIED: Chronic | Status: ACTIVE | Noted: 2018-10-23

## 2018-10-30 PROBLEM — E11.9 TYPE 2 DIABETES MELLITUS WITHOUT COMPLICATIONS: Chronic | Status: ACTIVE | Noted: 2018-10-23

## 2018-10-30 PROBLEM — I10 ESSENTIAL (PRIMARY) HYPERTENSION: Chronic | Status: ACTIVE | Noted: 2018-10-23

## 2018-10-30 PROBLEM — J18.9 PNEUMONIA, UNSPECIFIED ORGANISM: Chronic | Status: ACTIVE | Noted: 2018-10-23

## 2018-10-30 PROBLEM — I25.10 ATHEROSCLEROTIC HEART DISEASE OF NATIVE CORONARY ARTERY WITHOUT ANGINA PECTORIS: Chronic | Status: ACTIVE | Noted: 2018-10-23

## 2018-10-30 PROBLEM — Z95.0 PRESENCE OF CARDIAC PACEMAKER: Chronic | Status: ACTIVE | Noted: 2018-10-23

## 2018-10-30 PROBLEM — I25.5 ISCHEMIC CARDIOMYOPATHY: Chronic | Status: ACTIVE | Noted: 2018-10-23

## 2018-11-06 ENCOUNTER — APPOINTMENT (OUTPATIENT)
Dept: CARDIOLOGY | Facility: CLINIC | Age: 83
End: 2018-11-06

## 2018-11-06 ENCOUNTER — INBOUND DOCUMENT (OUTPATIENT)
Age: 83
End: 2018-11-06

## 2018-11-08 ENCOUNTER — APPOINTMENT (OUTPATIENT)
Dept: CARDIOLOGY | Facility: CLINIC | Age: 83
End: 2018-11-08
Payer: MEDICARE

## 2018-11-08 VITALS
WEIGHT: 166 LBS | BODY MASS INDEX: 26.06 KG/M2 | SYSTOLIC BLOOD PRESSURE: 115 MMHG | RESPIRATION RATE: 15 BRPM | DIASTOLIC BLOOD PRESSURE: 70 MMHG | HEART RATE: 84 BPM | HEIGHT: 67 IN

## 2018-11-08 DIAGNOSIS — I10 ESSENTIAL (PRIMARY) HYPERTENSION: ICD-10-CM

## 2018-11-08 DIAGNOSIS — Z95.0 PRESENCE OF CARDIAC PACEMAKER: ICD-10-CM

## 2018-11-08 DIAGNOSIS — N28.9 DISORDER OF KIDNEY AND URETER, UNSPECIFIED: ICD-10-CM

## 2018-11-08 PROCEDURE — 99215 OFFICE O/P EST HI 40 MIN: CPT

## 2018-11-08 PROCEDURE — 93000 ELECTROCARDIOGRAM COMPLETE: CPT

## 2018-11-08 RX ORDER — CLOPIDOGREL BISULFATE 75 MG/1
75 TABLET, FILM COATED ORAL DAILY
Qty: 90 | Refills: 0 | Status: ACTIVE | COMMUNITY
Start: 2018-11-08

## 2018-11-08 NOTE — HISTORY OF PRESENT ILLNESS
[FreeTextEntry1] : Patient reports compliance taking all cardiac medications including dual anticoagulation Plavix and Eliquis as directed.  No aspirin due to elevated bleeding risk in the elderly as per Vascular Surgeon (Dr. Martin).

## 2018-11-08 NOTE — ASSESSMENT
[FreeTextEntry1] : EKG 11/08/2018:  The EKG illustrates ventricular paced rhythm with underlying atrial fibrillation, rate of 86, 100% ventricular capture.  Essentially unchanged.

## 2018-11-08 NOTE — PHYSICAL EXAM
[Normal Appearance] : normal appearance [General Appearance - In No Acute Distress] : no acute distress [Normal Conjunctiva] : the conjunctiva exhibited no abnormalities [Normal Oral Mucosa] : normal oral mucosa [Normal Oropharynx] : normal oropharynx [Normal Jugular Venous A Waves Present] : normal jugular venous A waves present [Normal Jugular Venous V Waves Present] : normal jugular venous V waves present [No Jugular Venous Camacho A Waves] : no jugular venous camacho A waves [] : no respiratory distress [Respiration, Rhythm And Depth] : normal respiratory rhythm and effort [Auscultation Breath Sounds / Voice Sounds] : lungs were clear to auscultation bilaterally [Heart Rate And Rhythm] : heart rate and rhythm were normal [Heart Sounds] : normal S1 and S2 [Edema] : no peripheral edema present [FreeTextEntry1] : Grade I/VI to II/VI midsystolic murmur [Bowel Sounds] : normal bowel sounds [Abnormal Walk] : normal gait [Nail Clubbing] : no clubbing of the fingernails [Cyanosis, Localized] : no localized cyanosis [Skin Color & Pigmentation] : normal skin color and pigmentation [Oriented To Time, Place, And Person] : oriented to person, place, and time [Impaired Insight] : insight and judgment were intact [Affect] : the affect was normal

## 2018-11-08 NOTE — REASON FOR VISIT
[FreeTextEntry1] : The patient is an 83-year-old white male with known history of ischemic cardiomyopathy and LV dysfunction, Chronic atrial fibrillation and recent abnormal nuclear stress test.  Mr. Patterson is here status post cardiac catheterization with PCI x 2 SHALONDA on proximal SVG to RCA from [facility] performed by Dr. Martin on October 23rd.  He reports feeling much better status post discharge.  The patient denies CP, SOB, PND, orthopnea, palpitations, presyncope, syncope.

## 2018-11-08 NOTE — DISCUSSION/SUMMARY
[FreeTextEntry1] : 1).  Patient tolerating cardiac medications without negative side effects, continue with current medication regimen including Metoprolol Tartrate 50 mg BID, Eliquis 2.5 mg BID, Plavix 75 mg QD, Lasix 40 mg QD, Lisinopril 10 mg QD, and Simvastatin 40 mg QHS.\par 2).  Counseled patient about not taking any unnecessary risks such as increased physical activity for approximately 1 month post catheterization.  At that point, encouraged him to complete an Exercise Stress Test and subsequently participate in cardiac rehabilitation, patient refuses at this time.\par 3).  Diet and lifestyle modification discussed including low fat and low carbohydrate diet.\par 4).  Immediately report any untoward symptoms. \par 5).  Follow up with our office in 3 to 4 months or PRN.\par

## 2018-11-11 PROCEDURE — 99153 MOD SED SAME PHYS/QHP EA: CPT

## 2018-11-11 PROCEDURE — C1725: CPT

## 2018-11-11 PROCEDURE — C1887: CPT

## 2018-11-11 PROCEDURE — 85610 PROTHROMBIN TIME: CPT

## 2018-11-11 PROCEDURE — C1769: CPT

## 2018-11-11 PROCEDURE — 99152 MOD SED SAME PHYS/QHP 5/>YRS: CPT

## 2018-11-11 PROCEDURE — 80053 COMPREHEN METABOLIC PANEL: CPT

## 2018-11-11 PROCEDURE — 86850 RBC ANTIBODY SCREEN: CPT

## 2018-11-11 PROCEDURE — 86901 BLOOD TYPING SEROLOGIC RH(D): CPT

## 2018-11-11 PROCEDURE — 83036 HEMOGLOBIN GLYCOSYLATED A1C: CPT

## 2018-11-11 PROCEDURE — 93455 CORONARY ART/GRFT ANGIO S&I: CPT | Mod: XU

## 2018-11-11 PROCEDURE — 85027 COMPLETE CBC AUTOMATED: CPT

## 2018-11-11 PROCEDURE — 86900 BLOOD TYPING SEROLOGIC ABO: CPT

## 2018-11-11 PROCEDURE — C9604: CPT | Mod: RC

## 2018-11-11 PROCEDURE — C1884: CPT

## 2018-11-11 PROCEDURE — 93005 ELECTROCARDIOGRAM TRACING: CPT

## 2018-11-11 PROCEDURE — C1894: CPT

## 2018-11-11 PROCEDURE — 80048 BASIC METABOLIC PNL TOTAL CA: CPT

## 2018-11-11 PROCEDURE — 76937 US GUIDE VASCULAR ACCESS: CPT

## 2018-11-11 PROCEDURE — C1874: CPT

## 2018-11-11 PROCEDURE — 83735 ASSAY OF MAGNESIUM: CPT

## 2018-11-11 PROCEDURE — 85730 THROMBOPLASTIN TIME PARTIAL: CPT

## 2018-11-11 PROCEDURE — 36415 COLL VENOUS BLD VENIPUNCTURE: CPT

## 2018-11-11 PROCEDURE — 82962 GLUCOSE BLOOD TEST: CPT

## 2018-11-11 PROCEDURE — C1760: CPT

## 2018-12-04 ENCOUNTER — APPOINTMENT (OUTPATIENT)
Dept: CARDIOLOGY | Facility: CLINIC | Age: 83
End: 2018-12-04
Payer: MEDICARE

## 2018-12-04 PROCEDURE — 93279 PRGRMG DEV EVAL PM/LDLS PM: CPT

## 2019-01-30 ENCOUNTER — RX RENEWAL (OUTPATIENT)
Age: 84
End: 2019-01-30

## 2019-01-30 RX ORDER — METOPROLOL TARTRATE 50 MG/1
50 TABLET, FILM COATED ORAL
Qty: 180 | Refills: 3 | Status: ACTIVE | COMMUNITY
Start: 2018-10-16 | End: 1900-01-01

## 2019-02-15 ENCOUNTER — NON-APPOINTMENT (OUTPATIENT)
Age: 84
End: 2019-02-15

## 2019-02-15 ENCOUNTER — APPOINTMENT (OUTPATIENT)
Dept: CARDIOLOGY | Facility: CLINIC | Age: 84
End: 2019-02-15
Payer: MEDICARE

## 2019-02-15 VITALS
RESPIRATION RATE: 16 BRPM | HEIGHT: 67 IN | SYSTOLIC BLOOD PRESSURE: 162 MMHG | DIASTOLIC BLOOD PRESSURE: 78 MMHG | HEART RATE: 78 BPM | WEIGHT: 157 LBS | BODY MASS INDEX: 24.64 KG/M2

## 2019-02-15 DIAGNOSIS — I51.9 HEART DISEASE, UNSPECIFIED: ICD-10-CM

## 2019-02-15 DIAGNOSIS — I25.10 ATHEROSCLEROTIC HEART DISEASE OF NATIVE CORONARY ARTERY W/OUT ANGINA PECTORIS: ICD-10-CM

## 2019-02-15 DIAGNOSIS — D64.9 ANEMIA, UNSPECIFIED: ICD-10-CM

## 2019-02-15 DIAGNOSIS — R41.0 DISORIENTATION, UNSPECIFIED: ICD-10-CM

## 2019-02-15 DIAGNOSIS — I48.2 CHRONIC ATRIAL FIBRILLATION: ICD-10-CM

## 2019-02-15 PROCEDURE — 93000 ELECTROCARDIOGRAM COMPLETE: CPT

## 2019-02-15 PROCEDURE — 99214 OFFICE O/P EST MOD 30 MIN: CPT

## 2019-02-15 NOTE — REASON FOR VISIT
[Follow-Up - Clinic] : a clinic follow-up of [FreeTextEntry1] : The patient is an 83-year-old gentleman with known history for coronary disease and cardiomyopathy with mild LV dysfunction, chronic atrial fibrillation and recent cardiac catheter and PCI x2 drug-eluting stents to saphenous vein graft to RCA performed 10/23/18;\par \par He had been convalescing nicely and had been on anticoagulation plus antiplatelet medication (no aspirin however)\par \par He denies any significant chest pain, palpitations or dizziness.;\par \par His wife reports however that he's had loss of appetite and had some weight loss was noted to be mildly anemic and was recently started on iron supplements per\par \par He was recommended to have stools tested for occult blood and states that he may have seen some dark stools several weeks back briefly;;

## 2019-02-15 NOTE — REVIEW OF SYSTEMS
[Feeling Fatigued] : feeling fatigued [Recent Weight Loss (___ Lbs)] : recent [unfilled] ~Ulb weight loss [Change in Appetite] : change in appetite [Negative] : Heme/Lymph

## 2019-02-15 NOTE — HISTORY OF PRESENT ILLNESS
[FreeTextEntry1] : He's been taking his medications regularly;\par \par There is been no PND or orthopnea;

## 2019-02-15 NOTE — PHYSICAL EXAM
[General Appearance - Well Developed] : well developed [Normal Appearance] : normal appearance [Well Groomed] : well groomed [General Appearance - Well Nourished] : well nourished [No Deformities] : no deformities [General Appearance - In No Acute Distress] : no acute distress [Normal Conjunctiva] : the conjunctiva exhibited no abnormalities [Eyelids - No Xanthelasma] : the eyelids demonstrated no xanthelasmas [Normal Oral Mucosa] : normal oral mucosa [No Oral Pallor] : no oral pallor [No Oral Cyanosis] : no oral cyanosis [Normal Jugular Venous A Waves Present] : normal jugular venous A waves present [Normal Jugular Venous V Waves Present] : normal jugular venous V waves present [No Jugular Venous Camacho A Waves] : no jugular venous camacho A waves [Respiration, Rhythm And Depth] : normal respiratory rhythm and effort [Exaggerated Use Of Accessory Muscles For Inspiration] : no accessory muscle use [Auscultation Breath Sounds / Voice Sounds] : lungs were clear to auscultation bilaterally [Edema] : no peripheral edema present [FreeTextEntry1] : Regular paced rhythm with moderate rate grade 1-2/6 systolic murmur [Abdomen Soft] : soft [Abdomen Tenderness] : non-tender [Abdomen Mass (___ Cm)] : no abdominal mass palpated [Abnormal Walk] : normal gait [Gait - Sufficient For Exercise Testing] : the gait was sufficient for exercise testing [Nail Clubbing] : no clubbing of the fingernails [Cyanosis, Localized] : no localized cyanosis [Petechial Hemorrhages (___cm)] : no petechial hemorrhages [Skin Color & Pigmentation] : normal skin color and pigmentation [] : no rash [No Venous Stasis] : no venous stasis [Skin Lesions] : no skin lesions [No Skin Ulcers] : no skin ulcer [No Xanthoma] : no  xanthoma was observed [Oriented To Time, Place, And Person] : oriented to person, place, and time [Affect] : the affect was normal [Mood] : the mood was normal [No Anxiety] : not feeling anxious

## 2019-03-14 ENCOUNTER — RX RENEWAL (OUTPATIENT)
Age: 84
End: 2019-03-14

## 2019-03-14 RX ORDER — FUROSEMIDE 40 MG/1
40 TABLET ORAL
Qty: 90 | Refills: 3 | Status: ACTIVE | COMMUNITY
Start: 2019-03-14 | End: 1900-01-01

## 2019-03-14 RX ORDER — LISINOPRIL 10 MG/1
10 TABLET ORAL DAILY
Qty: 90 | Refills: 3 | Status: ACTIVE | COMMUNITY
Start: 2019-03-14 | End: 1900-01-01

## 2019-04-10 NOTE — ASSESSMENT
[FreeTextEntry1] : EKG shows ventricular paced rhythm at a rate of 78 with underlying atrial fibrillation suspected;\par \par In summary the patient is an 83-year-old gentleman with history of ischemic cardiomyopathy, remote history for CABG, prior history of atrial fibrillation and recent coronary stents x2 (October 2018);\par \par Although he has had a stable cardiac pattern, he has had some weight loss, loss of appetite, and now recently found to be anemic;\par \par Plan:\par \par Recommend patient followup to primary care physician;\par \par Consider GI referral at this time discussed with patient and his wife;\par \par Recommended to continue current medical regimen at this time;\par \par Patient to report any additional untoward chest symptoms or dizziness or lightheadedness;\par \par Followup in this office within 2-3 months or p.r.n.\par 
Patient

## 2019-05-28 ENCOUNTER — APPOINTMENT (OUTPATIENT)
Dept: CARDIOLOGY | Facility: CLINIC | Age: 84
End: 2019-05-28

## 2019-06-05 ENCOUNTER — APPOINTMENT (OUTPATIENT)
Dept: CARDIOLOGY | Facility: CLINIC | Age: 84
End: 2019-06-05